# Patient Record
Sex: MALE | Race: WHITE | NOT HISPANIC OR LATINO | Employment: FULL TIME | URBAN - METROPOLITAN AREA
[De-identification: names, ages, dates, MRNs, and addresses within clinical notes are randomized per-mention and may not be internally consistent; named-entity substitution may affect disease eponyms.]

---

## 2021-03-24 ENCOUNTER — TELEPHONE (OUTPATIENT)
Dept: SCHEDULING | Facility: IMAGING CENTER | Age: 41
End: 2021-03-24

## 2021-05-13 NOTE — PROGRESS NOTES
Virtual Visit: New Patient   This visit was conducted via Zoom using secure and encrypted videoconferencing technology. The patient was in a private location in the state of [place].    The patient's identity was confirmed and verbal consent was obtained for this virtual visit.    Subjective:     CC: Establish care    Boo Mendez is a 41 y.o. male presenting to establish care and to discuss the evaluation and management of:    The patient states he has PTSD symptoms for which she is on Paxil 10 mg daily.  He was previously on Zoloft but this made him feel too fatigued and foggy.  He states he has been on the Paxil for about 1 year.  He reports progressive fatigue over the last 6 months.  He feels tired all the time.  He has no motivation.  He reports decreased libido.  He has also had weight gain.  He denies any erectile dysfunction symptoms or difficulty sleeping.  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  He states he had MRI imaging done and was scheduled for surgery when Covid hit.  He does not recall who he was seeing for this but he will try to recall the name and send it to me via [employer] so we can obtain these records.  He states it is not currently causing him any pain.      ROS:   Gen: no fevers/chills  Pulm: no sob, no cough  CV: no chest pain, no palpitations  GI: no nausea/vomiting, no diarrhea  Neuro: no headaches, no numbness/tingling    No Known Allergies    Current medicines (including changes today)  Current Outpatient Medications   Medication Sig Dispense Refill   • PARoxetine (PAXIL) 10 MG Tab Take 10 mg by mouth every day.       No current facility-administered medications for this visit.       He  has no past medical history on file.  He  has no past surgical history on file.      Family History   Problem Relation Age of Onset   • No Known Problems Mother    • No Known Problems Father      No family status information on file.  "      Patient Active Problem List    Diagnosis Date Noted   • PTSD (post-traumatic stress disorder) 05/14/2021   • Fatigue 05/14/2021   • Neuroma 05/14/2021          Objective:   Resp 14   Ht 1.778 m (5' 10\")   Wt 88.5 kg (195 lb)   BMI 27.98 kg/m²     Physical Exam:  Constitutional: Alert, no distress, well-groomed.  Skin: No rashes in visible areas.  Eye: Round. Conjunctiva clear, lids normal. No icterus.   ENMT: Lips pink without lesions, good dentition, moist mucous membranes. Phonation normal.  Neck: No masses, no thyromegaly. Moves freely without pain.  Respiratory: Unlabored respiratory effort, no cough or audible wheeze  Psych: Alert and oriented x3, normal affect and mood.       Assessment and Plan:   The following treatment plan was discussed:     PTSD (post-traumatic stress disorder)  This is a chronic condition.  The patient is on Paxil 10 mg daily.  He states his symptoms are well controlled on this medication.  He did not do well on Zoloft.  -Continue Paxil 10 mg daily    Fatigue, unspecified type  This is an acute condition.  Will check labs to see if there are any metabolic abnormalities that may be causing this.  If everything is normal then one possibility could be that it is Paxil induced.  Should that be the case we can discuss different treatment options for his PTSD.  - TSH; Future  - FREE THYROXINE; Future  - TESTOSTERONE BIO/SHBG MALE; Future    Neuroma  This is a chronic condition.  Patient has had previous evaluation as well as MRI imaging.  He is going to attempt to locate the name of the provider where he was evaluated so that we can obtain those records.    Screening for deficiency anemia  - CBC WITHOUT DIFFERENTIAL; Future    Screening for cardiovascular condition  - Lipid Profile; Future    Encounter for screening for diabetes mellitus  - Comp Metabolic Panel; Future      Follow-up: Return in about 2 weeks (around 5/28/2021).     Please note that this dictation was created using " voice recognition software. I have made every reasonable attempt to correct obvious errors, but I expect that there are errors of grammar and possibly content that I did not discover before finalizing the note.

## 2021-05-14 ENCOUNTER — TELEMEDICINE (OUTPATIENT)
Dept: MEDICAL GROUP | Facility: PHYSICIAN GROUP | Age: 41
End: 2021-05-14
Payer: OTHER GOVERNMENT

## 2021-05-14 VITALS — WEIGHT: 195 LBS | HEIGHT: 70 IN | RESPIRATION RATE: 14 BRPM | BODY MASS INDEX: 27.92 KG/M2

## 2021-05-14 DIAGNOSIS — Z13.0 SCREENING FOR DEFICIENCY ANEMIA: ICD-10-CM

## 2021-05-14 DIAGNOSIS — Z13.6 SCREENING FOR CARDIOVASCULAR CONDITION: ICD-10-CM

## 2021-05-14 DIAGNOSIS — R53.83 FATIGUE, UNSPECIFIED TYPE: ICD-10-CM

## 2021-05-14 DIAGNOSIS — F43.10 PTSD (POST-TRAUMATIC STRESS DISORDER): ICD-10-CM

## 2021-05-14 DIAGNOSIS — D36.10 NEUROMA: ICD-10-CM

## 2021-05-14 DIAGNOSIS — Z13.1 ENCOUNTER FOR SCREENING FOR DIABETES MELLITUS: ICD-10-CM

## 2021-05-14 PROCEDURE — 99204 OFFICE O/P NEW MOD 45 MIN: CPT | Performed by: INTERNAL MEDICINE

## 2021-05-14 RX ORDER — PAROXETINE 10 MG/1
10 TABLET, FILM COATED ORAL DAILY
COMMUNITY
End: 2021-05-25

## 2021-05-14 ASSESSMENT — PATIENT HEALTH QUESTIONNAIRE - PHQ9: CLINICAL INTERPRETATION OF PHQ2 SCORE: 0

## 2021-05-18 ENCOUNTER — HOSPITAL ENCOUNTER (OUTPATIENT)
Dept: LAB | Facility: MEDICAL CENTER | Age: 41
End: 2021-05-18
Attending: INTERNAL MEDICINE
Payer: OTHER GOVERNMENT

## 2021-05-18 DIAGNOSIS — Z13.1 ENCOUNTER FOR SCREENING FOR DIABETES MELLITUS: ICD-10-CM

## 2021-05-18 DIAGNOSIS — Z13.6 SCREENING FOR CARDIOVASCULAR CONDITION: ICD-10-CM

## 2021-05-18 DIAGNOSIS — Z13.0 SCREENING FOR DEFICIENCY ANEMIA: ICD-10-CM

## 2021-05-18 DIAGNOSIS — R53.83 FATIGUE, UNSPECIFIED TYPE: ICD-10-CM

## 2021-05-18 LAB
ALBUMIN SERPL BCP-MCNC: 4.3 G/DL (ref 3.2–4.9)
ALBUMIN/GLOB SERPL: 1.4 G/DL
ALP SERPL-CCNC: 85 U/L (ref 30–99)
ALT SERPL-CCNC: 33 U/L (ref 2–50)
ANION GAP SERPL CALC-SCNC: 9 MMOL/L (ref 7–16)
AST SERPL-CCNC: 19 U/L (ref 12–45)
BILIRUB SERPL-MCNC: 0.5 MG/DL (ref 0.1–1.5)
BUN SERPL-MCNC: 13 MG/DL (ref 8–22)
CALCIUM SERPL-MCNC: 9.2 MG/DL (ref 8.5–10.5)
CHLORIDE SERPL-SCNC: 104 MMOL/L (ref 96–112)
CHOLEST SERPL-MCNC: 149 MG/DL (ref 100–199)
CO2 SERPL-SCNC: 25 MMOL/L (ref 20–33)
CREAT SERPL-MCNC: 1.2 MG/DL (ref 0.5–1.4)
ERYTHROCYTE [DISTWIDTH] IN BLOOD BY AUTOMATED COUNT: 39.6 FL (ref 35.9–50)
GLOBULIN SER CALC-MCNC: 3 G/DL (ref 1.9–3.5)
GLUCOSE SERPL-MCNC: 101 MG/DL (ref 65–99)
HCT VFR BLD AUTO: 47.7 % (ref 42–52)
HDLC SERPL-MCNC: 39 MG/DL
HGB BLD-MCNC: 16.1 G/DL (ref 14–18)
LDLC SERPL CALC-MCNC: 81 MG/DL
MCH RBC QN AUTO: 30.3 PG (ref 27–33)
MCHC RBC AUTO-ENTMCNC: 33.8 G/DL (ref 33.7–35.3)
MCV RBC AUTO: 89.7 FL (ref 81.4–97.8)
PLATELET # BLD AUTO: 264 K/UL (ref 164–446)
PMV BLD AUTO: 9.9 FL (ref 9–12.9)
POTASSIUM SERPL-SCNC: 4.2 MMOL/L (ref 3.6–5.5)
PROT SERPL-MCNC: 7.3 G/DL (ref 6–8.2)
RBC # BLD AUTO: 5.32 M/UL (ref 4.7–6.1)
SODIUM SERPL-SCNC: 138 MMOL/L (ref 135–145)
T4 FREE SERPL-MCNC: 1.06 NG/DL (ref 0.93–1.7)
TRIGL SERPL-MCNC: 146 MG/DL (ref 0–149)
TSH SERPL DL<=0.005 MIU/L-ACNC: 1.71 UIU/ML (ref 0.38–5.33)
WBC # BLD AUTO: 6.9 K/UL (ref 4.8–10.8)

## 2021-05-18 PROCEDURE — 84403 ASSAY OF TOTAL TESTOSTERONE: CPT

## 2021-05-18 PROCEDURE — 85027 COMPLETE CBC AUTOMATED: CPT

## 2021-05-18 PROCEDURE — 36415 COLL VENOUS BLD VENIPUNCTURE: CPT

## 2021-05-18 PROCEDURE — 84270 ASSAY OF SEX HORMONE GLOBUL: CPT

## 2021-05-18 PROCEDURE — 80053 COMPREHEN METABOLIC PANEL: CPT

## 2021-05-18 PROCEDURE — 84439 ASSAY OF FREE THYROXINE: CPT

## 2021-05-18 PROCEDURE — 80061 LIPID PANEL: CPT

## 2021-05-18 PROCEDURE — 84402 ASSAY OF FREE TESTOSTERONE: CPT

## 2021-05-18 PROCEDURE — 84443 ASSAY THYROID STIM HORMONE: CPT

## 2021-05-20 LAB
SHBG SERPL-SCNC: 21 NMOL/L (ref 11–80)
TESTOST FREE MFR SERPL: 2.2 % (ref 1.6–2.9)
TESTOST FREE SERPL-MCNC: 79 PG/ML (ref 47–244)
TESTOST SERPL-MCNC: 357 NG/DL (ref 300–890)
TESTOSTERONE.FREE+WB SERPL-MCNC: 226 NG/DL (ref 131–682)

## 2021-05-25 ENCOUNTER — TELEMEDICINE (OUTPATIENT)
Dept: MEDICAL GROUP | Facility: PHYSICIAN GROUP | Age: 41
End: 2021-05-25
Payer: OTHER GOVERNMENT

## 2021-05-25 VITALS — HEIGHT: 70 IN | BODY MASS INDEX: 26.77 KG/M2 | WEIGHT: 187 LBS | RESPIRATION RATE: 14 BRPM

## 2021-05-25 DIAGNOSIS — D36.10 NEUROMA: ICD-10-CM

## 2021-05-25 DIAGNOSIS — R53.83 FATIGUE, UNSPECIFIED TYPE: ICD-10-CM

## 2021-05-25 DIAGNOSIS — F43.10 PTSD (POST-TRAUMATIC STRESS DISORDER): ICD-10-CM

## 2021-05-25 PROCEDURE — 99214 OFFICE O/P EST MOD 30 MIN: CPT | Mod: GT | Performed by: INTERNAL MEDICINE

## 2021-05-25 RX ORDER — FLUOXETINE HYDROCHLORIDE 20 MG/1
20 CAPSULE ORAL DAILY
Qty: 30 CAPSULE | Refills: 3 | Status: SHIPPED | OUTPATIENT
Start: 2021-05-25 | End: 2022-02-23

## 2021-05-25 ASSESSMENT — FIBROSIS 4 INDEX: FIB4 SCORE: 0.5

## 2021-06-25 NOTE — PROGRESS NOTES
"Virtual Visit: Established Patient   This visit was conducted via Zoom using secure and encrypted videoconferencing technology. The patient was in a private location in the [facility].    The patient's identity was confirmed and verbal consent was obtained for this virtual visit.    Subjective:   CC: Follow-up on PTSD    Judd Mendez is a 41 y.o. male presenting for evaluation and management of:    At the patient's last visit he was changed over to fluoxetine 20 mg daily.  The patient states that he has noticed that he feels more groggy on the fluoxetine.  His grogginess is worse when he is tired.  It is particularly difficult in the mornings when he is tired.      ROS   Denies any recent fevers or chills. No nausea or vomiting. No chest pains or shortness of breath.     No Known Allergies    Current medicines (including changes today)  Current Outpatient Medications   Medication Sig Dispense Refill   • FLUoxetine (PROZAC) 20 MG Cap Take 1 capsule by mouth every day. 30 capsule 3     No current facility-administered medications for this visit.       Patient Active Problem List    Diagnosis Date Noted   • PTSD (post-traumatic stress disorder) 05/14/2021   • Fatigue 05/14/2021   • Neuroma 05/14/2021       Family History   Problem Relation Age of Onset   • No Known Problems Mother    • No Known Problems Father        He  has no past medical history on file.  He  has no past surgical history on file.       Objective:   Resp 14   Ht 1.778 m (5' 10\")   Wt 81.6 kg (180 lb)   BMI 25.83 kg/m²     Physical Exam:  Constitutional: Alert, no distress, well-groomed.  Skin: No rashes in visible areas.  Eye: Round. Conjunctiva clear, lids normal. No icterus.   ENMT: Lips pink without lesions, good dentition, moist mucous membranes. Phonation normal.  Neck: No masses, no thyromegaly. Moves freely without pain.  Respiratory: Unlabored respiratory effort, no cough or audible wheeze  Psych: Alert and oriented x3, normal " affect and mood.       Assessment and Plan:   The following treatment plan was discussed:     PTSD (post-traumatic stress disorder)  This is a chronic condition.    Stable.    At the patient's last visit he was transitioned from paroxetine to fluoxetine 20 mg daily as he was experiencing fatigue from the paroxetine.  Patient reports increased grogginess on the fluoxetine.  He did not do well on Zoloft.  -Continue fluoxetine 20 mg daily for now and see if his grogginess improves.  If no improvement patient will need to decide whether the paroxetine or fluoxetine is more effective and tolerable for him.  -Patient to follow-up in 4 weeks to assess is doing on the medication    My total time spent caring for the patient on the day of the encounter was 15 minutes.   This does not include time spent on separately billable procedures/tests.    Follow-up: Return in about 4 weeks (around 7/27/2021) for Med Check.     Please note that this dictation was created using voice recognition software. I have made every reasonable attempt to correct obvious errors, but I expect that there are errors of grammar and possibly content that I did not discover before finalizing the note.

## 2021-06-29 ENCOUNTER — TELEMEDICINE (OUTPATIENT)
Dept: MEDICAL GROUP | Facility: PHYSICIAN GROUP | Age: 41
End: 2021-06-29
Payer: OTHER GOVERNMENT

## 2021-06-29 VITALS — BODY MASS INDEX: 25.77 KG/M2 | HEIGHT: 70 IN | RESPIRATION RATE: 14 BRPM | WEIGHT: 180 LBS

## 2021-06-29 DIAGNOSIS — F43.10 PTSD (POST-TRAUMATIC STRESS DISORDER): ICD-10-CM

## 2021-06-29 PROCEDURE — 99212 OFFICE O/P EST SF 10 MIN: CPT | Performed by: INTERNAL MEDICINE

## 2021-06-29 ASSESSMENT — FIBROSIS 4 INDEX: FIB4 SCORE: 0.5

## 2022-02-21 NOTE — PROGRESS NOTES
"Subjective:     CC:   Chief Complaint   Patient presents with   • Annual Wellness Visit         HPI:   Judd presents today to discuss the following issues:    The patient states he stopped taking any medication for his PTSD.  He is doing pretty well.  He still has some bouts of anger, but overall it is tolerable.  He also reports intermittent right hand pain and weakness over the last 2 years.  He states it comes and goes.  It is on his right hand at the base of his thumb.  He is right-handed and has now noticed a weakened .  He has taken ibuprofen for it occasionally with some improvement.      History reviewed. No pertinent past medical history.    Social History     Tobacco Use   • Smoking status: Former Smoker   • Smokeless tobacco: Never Used   Vaping Use   • Vaping Use: Never used   Substance Use Topics   • Alcohol use: Yes     Comment: 2-3/week   • Drug use: Never       Current Outpatient Medications Ordered in Epic   Medication Sig Dispense Refill   • FLUoxetine (PROZAC) 20 MG Cap Take 1 capsule by mouth every day. 30 capsule 3     No current [facility]-ordered facility-administered medications on file.       Allergies:  Patient has no known allergies.    Health Maintenance: Completed    ROS:   Denies any recent fevers or chills. No nausea or vomiting. No chest pains or shortness of breath.      Objective:       Exam:  /78 (BP Location: Right arm, Patient Position: Sitting, BP Cuff Size: Adult)   Pulse 70   Temp 36.8 °C (98.2 °F) (Temporal)   Resp 18   Ht 1.778 m (5' 10\")   Wt 84.4 kg (186 lb)   SpO2 97%   BMI 26.69 kg/m²  Body mass index is 26.69 kg/m².    Gen: Alert and oriented, No apparent distress.  Lungs: Normal effort, CTA bilaterally, no wheezes, rhonchi, or rales  CV: Regular rate and rhythm. No murmurs, rubs, or gallops.  Ext: TTP over right thenar eminence, Phalen's and Tinel's negative, Finkelstein's test negative      Assessment & Plan:     41 y.o. male with the following - "     Hand pain, right  This is a chronic medical condition.  Progressive.  The patient is reporting a 2-year history of increasing right hand/thumb pain.  The pain is located over the right thumb pad.  It is also associated with  strength weakening.  He denies numbness or tingling.  Patient has TTP over the right thenar eminence, Phalen's, Tinel's, and Finkelstein's testing is negative.  The location of the pain does not appear to be completely consistent with carpal tunnel syndrome or de Quervain's tenosynovitis, but these are still possibilities.  Right thumb osteoarthritis is also a possibility.  Will initiate a comprehensive evaluation by getting an x-ray of the hand.  Patient was given an informational handout on de Quervain's tenosynovitis.  He was advised to try either a CTS wrist splint or de Quervain's tenosynovitis thumb splint.  - DX-HAND 3+ RIGHT; Future    PTSD (post-traumatic stress disorder)  This is a chronic medical condition.    Stable.      The patient is no longer taking medication for this condition.  Paroxetine, fluoxetine, and sertraline were all intolerable secondary to daytime grogginess.      Need for vaccination  - INFLUENZA VACCINE QUAD INJ (PF)  - TDAP VACCINE =>8YO IM        I spent a total of 30 minutes with record review, exam, communication with the patient, communication with other providers, and documentation of this encounter.      Return if symptoms worsen or fail to improve.    Please note that this dictation was created using voice recognition software. I have made every reasonable attempt to correct obvious errors, but I expect that there are errors of grammar and possibly content that I did not discover before finalizing the note.

## 2022-02-22 ENCOUNTER — OFFICE VISIT (OUTPATIENT)
Dept: MEDICAL GROUP | Facility: PHYSICIAN GROUP | Age: 42
End: 2022-02-22
Payer: OTHER GOVERNMENT

## 2022-02-22 ENCOUNTER — HOSPITAL ENCOUNTER (OUTPATIENT)
Dept: RADIOLOGY | Facility: MEDICAL CENTER | Age: 42
End: 2022-02-22
Attending: INTERNAL MEDICINE
Payer: OTHER GOVERNMENT

## 2022-02-22 VITALS
OXYGEN SATURATION: 97 % | WEIGHT: 186 LBS | RESPIRATION RATE: 18 BRPM | HEART RATE: 70 BPM | HEIGHT: 70 IN | SYSTOLIC BLOOD PRESSURE: 122 MMHG | BODY MASS INDEX: 26.63 KG/M2 | TEMPERATURE: 98.2 F | DIASTOLIC BLOOD PRESSURE: 78 MMHG

## 2022-02-22 DIAGNOSIS — F43.10 PTSD (POST-TRAUMATIC STRESS DISORDER): ICD-10-CM

## 2022-02-22 DIAGNOSIS — Z23 NEED FOR VACCINATION: ICD-10-CM

## 2022-02-22 DIAGNOSIS — M79.641 HAND PAIN, RIGHT: ICD-10-CM

## 2022-02-22 PROCEDURE — 90472 IMMUNIZATION ADMIN EACH ADD: CPT | Performed by: INTERNAL MEDICINE

## 2022-02-22 PROCEDURE — 99214 OFFICE O/P EST MOD 30 MIN: CPT | Mod: 25 | Performed by: INTERNAL MEDICINE

## 2022-02-22 PROCEDURE — 90686 IIV4 VACC NO PRSV 0.5 ML IM: CPT | Performed by: INTERNAL MEDICINE

## 2022-02-22 PROCEDURE — 90715 TDAP VACCINE 7 YRS/> IM: CPT | Performed by: INTERNAL MEDICINE

## 2022-02-22 PROCEDURE — 90471 IMMUNIZATION ADMIN: CPT | Performed by: INTERNAL MEDICINE

## 2022-02-22 PROCEDURE — 73130 X-RAY EXAM OF HAND: CPT | Mod: RT

## 2022-02-22 SDOH — ECONOMIC STABILITY: FOOD INSECURITY: WITHIN THE PAST 12 MONTHS, YOU WORRIED THAT YOUR FOOD WOULD RUN OUT BEFORE YOU GOT MONEY TO BUY MORE.: NEVER TRUE

## 2022-02-22 SDOH — ECONOMIC STABILITY: TRANSPORTATION INSECURITY
IN THE PAST 12 MONTHS, HAS LACK OF RELIABLE TRANSPORTATION KEPT YOU FROM MEDICAL APPOINTMENTS, MEETINGS, WORK OR FROM GETTING THINGS NEEDED FOR DAILY LIVING?: NO

## 2022-02-22 SDOH — ECONOMIC STABILITY: HOUSING INSECURITY
IN THE LAST 12 MONTHS, WAS THERE A TIME WHEN YOU DID NOT HAVE A STEADY PLACE TO SLEEP OR SLEPT IN A SHELTER (INCLUDING NOW)?: NO

## 2022-02-22 SDOH — ECONOMIC STABILITY: HOUSING INSECURITY: IN THE LAST 12 MONTHS, HOW MANY PLACES HAVE YOU LIVED?: 1

## 2022-02-22 SDOH — ECONOMIC STABILITY: INCOME INSECURITY: HOW HARD IS IT FOR YOU TO PAY FOR THE VERY BASICS LIKE FOOD, HOUSING, MEDICAL CARE, AND HEATING?: NOT HARD AT ALL

## 2022-02-22 SDOH — ECONOMIC STABILITY: INCOME INSECURITY: IN THE LAST 12 MONTHS, WAS THERE A TIME WHEN YOU WERE NOT ABLE TO PAY THE MORTGAGE OR RENT ON TIME?: NO

## 2022-02-22 SDOH — ECONOMIC STABILITY: FOOD INSECURITY: WITHIN THE PAST 12 MONTHS, THE FOOD YOU BOUGHT JUST DIDN'T LAST AND YOU DIDN'T HAVE MONEY TO GET MORE.: NEVER TRUE

## 2022-02-22 SDOH — ECONOMIC STABILITY: TRANSPORTATION INSECURITY
IN THE PAST 12 MONTHS, HAS THE LACK OF TRANSPORTATION KEPT YOU FROM MEDICAL APPOINTMENTS OR FROM GETTING MEDICATIONS?: NO

## 2022-02-22 SDOH — HEALTH STABILITY: PHYSICAL HEALTH: ON AVERAGE, HOW MANY DAYS PER WEEK DO YOU ENGAGE IN MODERATE TO STRENUOUS EXERCISE (LIKE A BRISK WALK)?: 5 DAYS

## 2022-02-22 SDOH — ECONOMIC STABILITY: TRANSPORTATION INSECURITY
IN THE PAST 12 MONTHS, HAS LACK OF TRANSPORTATION KEPT YOU FROM MEETINGS, WORK, OR FROM GETTING THINGS NEEDED FOR DAILY LIVING?: NO

## 2022-02-22 SDOH — HEALTH STABILITY: PHYSICAL HEALTH: ON AVERAGE, HOW MANY MINUTES DO YOU ENGAGE IN EXERCISE AT THIS LEVEL?: 20 MIN

## 2022-02-22 SDOH — HEALTH STABILITY: MENTAL HEALTH
STRESS IS WHEN SOMEONE FEELS TENSE, NERVOUS, ANXIOUS, OR CAN'T SLEEP AT NIGHT BECAUSE THEIR MIND IS TROUBLED. HOW STRESSED ARE YOU?: ONLY A LITTLE

## 2022-02-22 ASSESSMENT — SOCIAL DETERMINANTS OF HEALTH (SDOH)
HOW OFTEN DO YOU HAVE A DRINK CONTAINING ALCOHOL: 4 OR MORE TIMES A WEEK
HOW OFTEN DO YOU ATTEND CHURCH OR RELIGIOUS SERVICES?: NEVER
HOW OFTEN DO YOU GET TOGETHER WITH FRIENDS OR RELATIVES?: ONCE A WEEK
DO YOU BELONG TO ANY CLUBS OR ORGANIZATIONS SUCH AS CHURCH GROUPS UNIONS, FRATERNAL OR ATHLETIC GROUPS, OR SCHOOL GROUPS?: NO
HOW OFTEN DO YOU ATTENT MEETINGS OF THE CLUB OR ORGANIZATION YOU BELONG TO?: NEVER
HOW OFTEN DO YOU GET TOGETHER WITH FRIENDS OR RELATIVES?: ONCE A WEEK
WITHIN THE PAST 12 MONTHS, YOU WORRIED THAT YOUR FOOD WOULD RUN OUT BEFORE YOU GOT THE MONEY TO BUY MORE: NEVER TRUE
IN A TYPICAL WEEK, HOW MANY TIMES DO YOU TALK ON THE PHONE WITH FAMILY, FRIENDS, OR NEIGHBORS?: ONCE A WEEK
IN A TYPICAL WEEK, HOW MANY TIMES DO YOU TALK ON THE PHONE WITH FAMILY, FRIENDS, OR NEIGHBORS?: ONCE A WEEK
HOW OFTEN DO YOU ATTEND CHURCH OR RELIGIOUS SERVICES?: NEVER
HOW OFTEN DO YOU HAVE SIX OR MORE DRINKS ON ONE OCCASION: LESS THAN MONTHLY
HOW OFTEN DO YOU ATTENT MEETINGS OF THE CLUB OR ORGANIZATION YOU BELONG TO?: NEVER
HOW MANY DRINKS CONTAINING ALCOHOL DO YOU HAVE ON A TYPICAL DAY WHEN YOU ARE DRINKING: 1 OR 2
DO YOU BELONG TO ANY CLUBS OR ORGANIZATIONS SUCH AS CHURCH GROUPS UNIONS, FRATERNAL OR ATHLETIC GROUPS, OR SCHOOL GROUPS?: NO
HOW HARD IS IT FOR YOU TO PAY FOR THE VERY BASICS LIKE FOOD, HOUSING, MEDICAL CARE, AND HEATING?: NOT HARD AT ALL

## 2022-02-22 ASSESSMENT — LIFESTYLE VARIABLES
HOW OFTEN DO YOU HAVE A DRINK CONTAINING ALCOHOL: 4 OR MORE TIMES A WEEK
HOW MANY STANDARD DRINKS CONTAINING ALCOHOL DO YOU HAVE ON A TYPICAL DAY: 1 OR 2
HOW OFTEN DO YOU HAVE SIX OR MORE DRINKS ON ONE OCCASION: LESS THAN MONTHLY

## 2022-02-22 ASSESSMENT — PATIENT HEALTH QUESTIONNAIRE - PHQ9: CLINICAL INTERPRETATION OF PHQ2 SCORE: 0

## 2022-02-22 ASSESSMENT — FIBROSIS 4 INDEX: FIB4 SCORE: 0.5

## 2022-06-03 ENCOUNTER — OFFICE VISIT (OUTPATIENT)
Dept: URGENT CARE | Facility: PHYSICIAN GROUP | Age: 42
End: 2022-06-03
Payer: OTHER GOVERNMENT

## 2022-06-03 VITALS
HEIGHT: 70 IN | WEIGHT: 180 LBS | TEMPERATURE: 98.2 F | BODY MASS INDEX: 25.77 KG/M2 | OXYGEN SATURATION: 98 % | DIASTOLIC BLOOD PRESSURE: 78 MMHG | RESPIRATION RATE: 15 BRPM | SYSTOLIC BLOOD PRESSURE: 108 MMHG | HEART RATE: 61 BPM

## 2022-06-03 DIAGNOSIS — J01.90 ACUTE NON-RECURRENT SINUSITIS, UNSPECIFIED LOCATION: ICD-10-CM

## 2022-06-03 PROCEDURE — 99213 OFFICE O/P EST LOW 20 MIN: CPT | Performed by: PHYSICIAN ASSISTANT

## 2022-06-03 RX ORDER — DEXAMETHASONE SODIUM PHOSPHATE 10 MG/ML
10 INJECTION INTRAMUSCULAR; INTRAVENOUS ONCE
Status: COMPLETED | OUTPATIENT
Start: 2022-06-03 | End: 2022-06-03

## 2022-06-03 RX ORDER — FLUTICASONE PROPIONATE 50 MCG
1 SPRAY, SUSPENSION (ML) NASAL DAILY
Qty: 16 G | Refills: 0 | Status: SHIPPED | OUTPATIENT
Start: 2022-06-03 | End: 2022-10-15

## 2022-06-03 RX ADMIN — DEXAMETHASONE SODIUM PHOSPHATE 10 MG: 10 INJECTION INTRAMUSCULAR; INTRAVENOUS at 18:39

## 2022-06-03 ASSESSMENT — ENCOUNTER SYMPTOMS
SHORTNESS OF BREATH: 1
SINUS PAIN: 0
COUGH: 0
ABDOMINAL PAIN: 0
CHILLS: 0
DIZZINESS: 0
FEVER: 0
EYE PAIN: 0
CONSTIPATION: 0
DIAPHORESIS: 0
EYE REDNESS: 0
SORE THROAT: 1
NAUSEA: 0
DIARRHEA: 0
VOMITING: 0
HEADACHES: 0
EYE DISCHARGE: 0
WHEEZING: 0

## 2022-06-03 ASSESSMENT — FIBROSIS 4 INDEX: FIB4 SCORE: 0.51

## 2022-06-04 NOTE — PROGRESS NOTES
"  Subjective:     Judd Mendez  is a 42 y.o. male who presents for Cough (All symptoms 1 week), Nasal Congestion, and Otalgia       He presents today with sinus congestion, ear fullness, sore throat and shortness of breath that been ongoing since Saturday.  He is also been experiencing chest pain that is worsened after strenuous activities.  At this time he denies fever/chills/sweats, abdominal pain, nausea/vomiting, constipation/diarrhea.  Denies any close sick contacts.  Has been using DayQuil for symptom relief.       Review of Systems   Constitutional: Negative for chills, diaphoresis, fever and malaise/fatigue.   HENT: Positive for congestion, ear pain and sore throat. Negative for ear discharge, hearing loss and sinus pain.    Eyes: Negative for pain, discharge and redness.   Respiratory: Positive for shortness of breath. Negative for cough and wheezing.    Cardiovascular: Positive for chest pain.   Gastrointestinal: Negative for abdominal pain, constipation, diarrhea, nausea and vomiting.   Genitourinary: Negative for dysuria, frequency and urgency.   Neurological: Negative for dizziness and headaches.      No Known Allergies  History reviewed. No pertinent past medical history.     Objective:   /78 (BP Location: Left arm, Patient Position: Sitting, BP Cuff Size: Adult)   Pulse 61   Temp 36.8 °C (98.2 °F) (Temporal)   Resp 15   Ht 1.778 m (5' 10\")   Wt 81.6 kg (180 lb)   SpO2 98%   BMI 25.83 kg/m²   Physical Exam  Vitals and nursing note reviewed.   Constitutional:       General: He is not in acute distress.     Appearance: Normal appearance. He is not ill-appearing, toxic-appearing or diaphoretic.   HENT:      Head: Normocephalic.      Right Ear: Tympanic membrane, ear canal and external ear normal. There is no impacted cerumen.      Left Ear: Tympanic membrane, ear canal and external ear normal. There is no impacted cerumen.      Nose: Congestion present. No rhinorrhea.      Mouth/Throat: "      Mouth: Mucous membranes are moist.      Pharynx: Posterior oropharyngeal erythema present. No oropharyngeal exudate.   Eyes:      General:         Right eye: No discharge.         Left eye: No discharge.      Conjunctiva/sclera: Conjunctivae normal.   Cardiovascular:      Rate and Rhythm: Normal rate and regular rhythm.   Pulmonary:      Effort: Pulmonary effort is normal. No respiratory distress.      Breath sounds: Normal breath sounds. No stridor. No wheezing or rhonchi.   Musculoskeletal:      Cervical back: Neck supple.   Lymphadenopathy:      Cervical: No cervical adenopathy.   Neurological:      General: No focal deficit present.      Mental Status: He is alert and oriented to person, place, and time.   Psychiatric:         Mood and Affect: Mood normal.         Behavior: Behavior normal.         Thought Content: Thought content normal.         Judgment: Judgment normal.             Diagnostic testing: None    Assessment/Plan:     Encounter Diagnoses   Name Primary?   • Acute non-recurrent sinusitis, unspecified location       Plan for care for today's complaint includes Decadron oral drink in office today, Flonase.  Based on his symptom history and physical exam there is evidence that this is a viral upper respiratory infection, no antibiotics warranted at this time.  If symptoms remain ongoing for the next 7-10 days or if they get significantly worse then he should return the urgent care for reevaluation at that time.  She will follow-up in the emergency department if you develop severe chest pain, shortness of breath, difficulty breathing.  Prescription for Flonase provided.    See AVS Instructions below for written guidance provided to patient on after-visit management and care in addition to our verbal discussion during the visit.    Please note that this dictation was created using voice recognition software. I have attempted to correct all errors, but there may be sound-alike, spelling, grammar and  possibly content errors that I did not discover before finalizing the note.    Knox Rylee VARGHESE

## 2022-10-15 ENCOUNTER — OFFICE VISIT (OUTPATIENT)
Dept: URGENT CARE | Facility: PHYSICIAN GROUP | Age: 42
End: 2022-10-15
Payer: OTHER GOVERNMENT

## 2022-10-15 DIAGNOSIS — H16.002 CORNEAL ULCER OF LEFT EYE: ICD-10-CM

## 2022-10-15 PROCEDURE — 99214 OFFICE O/P EST MOD 30 MIN: CPT | Performed by: FAMILY MEDICINE

## 2022-10-15 RX ORDER — CIPROFLOXACIN HYDROCHLORIDE 3.5 MG/ML
SOLUTION/ DROPS TOPICAL
Qty: 10 ML | Refills: 0 | Status: SHIPPED | OUTPATIENT
Start: 2022-10-15 | End: 2023-04-27

## 2022-10-15 RX ORDER — CIPROFLOXACIN HYDROCHLORIDE 3.5 MG/ML
SOLUTION/ DROPS TOPICAL
Qty: 10 ML | Refills: 0 | Status: SHIPPED | OUTPATIENT
Start: 2022-10-15 | End: 2022-10-15 | Stop reason: SDUPTHER

## 2022-10-15 ASSESSMENT — ENCOUNTER SYMPTOMS
PHOTOPHOBIA: 1
EYE PAIN: 1
EYE REDNESS: 1

## 2022-10-15 ASSESSMENT — FIBROSIS 4 INDEX: FIB4 SCORE: 0.51

## 2022-10-15 NOTE — PROGRESS NOTES
"Subjective     Judd Mendez is a 42 y.o. male who presents with Eye Problem (Pt states he went to eye doctor yesterday, was given ABX, pharmacy didn't get it, offices are closed, symptoms have worsened in L eye. X 4 days)      - This is a pleasant and nontoxic appearing 42 y.o. male who has come to the walk-in clinic today for:    #1) ~4 days ago woke up w/ Lt eye irritation (has hx or getting corneal abrasions in his sleep). Irritation got worse and turned to pain and light sensitivity and a little blurry visio so went to Optometrist yesterday who got him in with Ophthamologist yesterday to evaluate a corneal ulcer. Says they were to call in an abx that started with Letter M for him but pharmacy never got it and no one from that office is returning his phone calls (on-call service has not returned my call either). Has f/u w/ their office Monday but wants to get on abx drops    No specific eye injury or use of contact lenses.       ALLERGIES:  Patient has no known allergies.     PMH:  History reviewed. No pertinent past medical history.     PSH:  History reviewed. No pertinent surgical history.    MEDS:    Current Outpatient Medications:     ciprofloxacin (CILOXIN) 0.3 % Solution, 2 gtt q 15 min x 6 hrs, then q 30 min x 18 hrs, then q 1 hr x 1 day, then q 4 hrs x 5 days, Disp: 10 mL, Rfl: 0    ** I have documented what I find to be significant in regards to past medical, social, family and surgical history  in my HPI or under PMH/PSH/FH review section, otherwise it is noncontributory **         HPI    Review of Systems   Eyes:  Positive for photophobia, pain and redness.   All other systems reviewed and are negative.           Objective     BP (P) 122/80 (BP Location: Right arm, Patient Position: Sitting, BP Cuff Size: Adult)   Pulse (P) 82   Temp (P) 37.2 °C (99 °F) (Temporal)   Resp (P) 18   Ht (P) 1.778 m (5' 10\")   Wt (P) 81.6 kg (180 lb)   SpO2 (P) 97%   BMI (P) 25.83 kg/m²      Rt 20/20   Lt not " barely able to see 20/200  B 20/25    Physical Exam  Vitals and nursing note reviewed.   Constitutional:       General: He is not in acute distress.     Appearance: Normal appearance. He is well-developed.   HENT:      Head: Normocephalic.   Eyes:        Comments: Lt eye: injected w/ clear watery eye dc, + corneal ulcer and some light sensitivity.    Cardiovascular:      Heart sounds: Normal heart sounds. No murmur heard.  Pulmonary:      Effort: Pulmonary effort is normal. No respiratory distress.   Neurological:      Mental Status: He is alert.      Motor: No abnormal muscle tone.   Psychiatric:         Mood and Affect: Mood normal.         Behavior: Behavior normal.       Assessment & Plan       1. Corneal ulcer of left eye  ciprofloxacin (CILOXIN) 0.3 % Solution    DISCONTINUED: ciprofloxacin (CILOXIN) 0.3 % Solution        Does no want to go to ER today for Optho consult but will go tomorrow if feeling eye is worse. Optho service from patient clinic returned call (Dr Betancourt) and we spoke, said Cipro drops are good choice to use and f/u as planned.  Will treat w/ above for corneal ulcer.     - Dx, plan & d/c instructions discussed   - Rest, stay hydrated, OTC Motrin and/or Tylenol as needed  - E.R. precautions discussed     Follow up with Eye Dr office Monday as planned ER if feeling/getting worse or vision change.     Patient left in stable condition

## 2023-04-25 NOTE — PROGRESS NOTES
"Subjective:     CC:   Chief Complaint   Patient presents with    Anxiety     Getting worse now patient states that it got worse once he stopped the antidepressants          HPI:   Judd Weeks is a 43-year-old male who presents today for an acute medical visit. The patient is reporting worsening PTSD symptoms.  He reports a progressive increase in his mood swings and becoming easily overwhelmed.  He has previously been on paroxetine, fluoxetine, and sertraline, but found them intolerable due to daytime grogginess.  He is interested in trying a new medication due to the severity of his symptoms.  He has no thoughts of self-harm.    History reviewed. No pertinent past medical history.    Social History     Tobacco Use    Smoking status: Former    Smokeless tobacco: Never   Vaping Use    Vaping Use: Never used   Substance Use Topics    Alcohol use: Yes     Comment: 5-7 drinks per week    Drug use: Never       Current Outpatient Medications Ordered in Epic   Medication Sig Dispense Refill    escitalopram (LEXAPRO) 5 MG tablet Take 1 Tablet by mouth every day. 90 Tablet 0     No current Epic-ordered facility-administered medications on file.       Allergies:  Patient has no known allergies.    Health Maintenance: Completed    Review of Systems:  No fevers or chills. No cough, chest pain, or shortness of breath.       Objective:       Exam:  /74   Pulse 65   Temp 36.7 °C (98.1 °F) (Temporal)   Ht 1.778 m (5' 10\")   Wt 86 kg (189 lb 9.6 oz)   SpO2 97%   BMI 27.20 kg/m²  Body mass index is 27.2 kg/m².    Gen: Alert and oriented, No apparent distress.  Lungs: Normal effort, CTA bilaterally, no wheezes, rhonchi, or rales  CV: Regular rate and rhythm. No murmurs, rubs, or gallops.  Ext: No clubbing, cyanosis, edema.        Assessment & Plan:     42 y.o. male with the following -     1. PTSD (post-traumatic stress disorder)  Chronic condition, progressive.  The patient is reporting worsening PTSD symptoms.  He " reports a progressive increase in his mood swings and becoming easily overwhelmed.  He has previously been on paroxetine, fluoxetine, and sertraline, but found them intolerable due to daytime grogginess.  He is interested in trying a new medication due to the severity of his symptoms.  He has no thoughts of self-harm.  -Start trial of escitalopram 5 mg daily, increasing to 10 mg to 20 mg as indicated by symptoms  - escitalopram (LEXAPRO) 5 MG tablet; Take 1 Tablet by mouth every day.  Dispense: 90 Tablet; Refill: 0    2. Screening for deficiency anemia  - CBC WITH DIFFERENTIAL; Future    3. Encounter for screening for diabetes mellitus  - Comp Metabolic Panel; Future    4. Screening for cardiovascular condition  - Lipid Profile; Future    5. Need for hepatitis C screening test  - HEP C VIRUS ANTIBODY; Future      Return in about 3 months (around 7/27/2023) for new antidepressant.    Please note that this dictation was created using voice recognition software. I have made every reasonable attempt to correct obvious errors, but I expect that there are errors of grammar and possibly content that I did not discover before finalizing the note.

## 2023-04-27 ENCOUNTER — OFFICE VISIT (OUTPATIENT)
Dept: MEDICAL GROUP | Facility: PHYSICIAN GROUP | Age: 43
End: 2023-04-27
Payer: COMMERCIAL

## 2023-04-27 VITALS
OXYGEN SATURATION: 97 % | TEMPERATURE: 98.1 F | HEART RATE: 65 BPM | DIASTOLIC BLOOD PRESSURE: 74 MMHG | HEIGHT: 70 IN | BODY MASS INDEX: 27.14 KG/M2 | WEIGHT: 189.6 LBS | SYSTOLIC BLOOD PRESSURE: 120 MMHG

## 2023-04-27 DIAGNOSIS — Z13.1 ENCOUNTER FOR SCREENING FOR DIABETES MELLITUS: ICD-10-CM

## 2023-04-27 DIAGNOSIS — Z13.0 SCREENING FOR DEFICIENCY ANEMIA: ICD-10-CM

## 2023-04-27 DIAGNOSIS — Z11.59 NEED FOR HEPATITIS C SCREENING TEST: ICD-10-CM

## 2023-04-27 DIAGNOSIS — Z13.6 SCREENING FOR CARDIOVASCULAR CONDITION: ICD-10-CM

## 2023-04-27 DIAGNOSIS — F43.10 PTSD (POST-TRAUMATIC STRESS DISORDER): ICD-10-CM

## 2023-04-27 PROCEDURE — 99214 OFFICE O/P EST MOD 30 MIN: CPT | Performed by: INTERNAL MEDICINE

## 2023-04-27 RX ORDER — ESCITALOPRAM OXALATE 5 MG/1
5 TABLET ORAL DAILY
Qty: 90 TABLET | Refills: 0 | Status: SHIPPED | OUTPATIENT
Start: 2023-04-27 | End: 2023-06-12 | Stop reason: SDUPTHER

## 2023-04-27 ASSESSMENT — FIBROSIS 4 INDEX: FIB4 SCORE: 0.53

## 2023-04-27 ASSESSMENT — PATIENT HEALTH QUESTIONNAIRE - PHQ9
SUM OF ALL RESPONSES TO PHQ QUESTIONS 1-9: 10
5. POOR APPETITE OR OVEREATING: 0 - NOT AT ALL
CLINICAL INTERPRETATION OF PHQ2 SCORE: 3

## 2023-06-12 DIAGNOSIS — F43.10 PTSD (POST-TRAUMATIC STRESS DISORDER): ICD-10-CM

## 2023-06-13 RX ORDER — ESCITALOPRAM OXALATE 5 MG/1
5 TABLET ORAL DAILY
Qty: 90 TABLET | Refills: 0 | Status: SHIPPED | OUTPATIENT
Start: 2023-06-13 | End: 2023-06-21 | Stop reason: DRUGHIGH

## 2023-06-20 DIAGNOSIS — F43.10 PTSD (POST-TRAUMATIC STRESS DISORDER): ICD-10-CM

## 2023-06-21 RX ORDER — ESCITALOPRAM OXALATE 10 MG/1
10 TABLET ORAL DAILY
Qty: 90 TABLET | Refills: 0 | Status: SHIPPED | OUTPATIENT
Start: 2023-06-21 | End: 2023-09-28 | Stop reason: SDUPTHER

## 2023-06-21 RX ORDER — ESCITALOPRAM OXALATE 5 MG/1
5 TABLET ORAL DAILY
Qty: 90 TABLET | Refills: 0 | OUTPATIENT
Start: 2023-06-21

## 2023-07-23 DIAGNOSIS — F43.10 PTSD (POST-TRAUMATIC STRESS DISORDER): ICD-10-CM

## 2023-07-24 NOTE — TELEPHONE ENCOUNTER
Received request via: Pharmacy    Was the patient seen in the last year in this department? Yes    Does the patient have an active prescription (recently filled or refills available) for medication(s) requested? No    Does the patient have [facility] and need 100 day supply (blood pressure, diabetes and cholesterol meds only)? Patient does not have SCP   Patient

## 2023-07-25 RX ORDER — ESCITALOPRAM OXALATE 5 MG/1
5 TABLET ORAL DAILY
Qty: 90 TABLET | Refills: 0 | Status: SHIPPED | OUTPATIENT
Start: 2023-07-25

## 2023-09-28 RX ORDER — ESCITALOPRAM OXALATE 10 MG/1
10 TABLET ORAL DAILY
Qty: 90 TABLET | Refills: 0 | Status: SHIPPED | OUTPATIENT
Start: 2023-09-28 | End: 2024-01-05 | Stop reason: SDUPTHER

## 2024-01-08 RX ORDER — ESCITALOPRAM OXALATE 10 MG/1
10 TABLET ORAL DAILY
Qty: 90 TABLET | Refills: 3 | Status: SHIPPED | OUTPATIENT
Start: 2024-01-08

## 2024-06-24 SDOH — ECONOMIC STABILITY: FOOD INSECURITY: WITHIN THE PAST 12 MONTHS, THE FOOD YOU BOUGHT JUST DIDN'T LAST AND YOU DIDN'T HAVE MONEY TO GET MORE.: NEVER TRUE

## 2024-06-24 SDOH — ECONOMIC STABILITY: HOUSING INSECURITY: IN THE LAST 12 MONTHS, HOW MANY PLACES HAVE YOU LIVED?: 1

## 2024-06-24 SDOH — HEALTH STABILITY: PHYSICAL HEALTH: ON AVERAGE, HOW MANY MINUTES DO YOU ENGAGE IN EXERCISE AT THIS LEVEL?: PATIENT DECLINED

## 2024-06-24 SDOH — HEALTH STABILITY: PHYSICAL HEALTH: ON AVERAGE, HOW MANY DAYS PER WEEK DO YOU ENGAGE IN MODERATE TO STRENUOUS EXERCISE (LIKE A BRISK WALK)?: 5 DAYS

## 2024-06-24 SDOH — ECONOMIC STABILITY: FOOD INSECURITY: WITHIN THE PAST 12 MONTHS, YOU WORRIED THAT YOUR FOOD WOULD RUN OUT BEFORE YOU GOT MONEY TO BUY MORE.: NEVER TRUE

## 2024-06-24 SDOH — ECONOMIC STABILITY: INCOME INSECURITY: IN THE LAST 12 MONTHS, WAS THERE A TIME WHEN YOU WERE NOT ABLE TO PAY THE MORTGAGE OR RENT ON TIME?: NO

## 2024-06-24 SDOH — ECONOMIC STABILITY: INCOME INSECURITY: HOW HARD IS IT FOR YOU TO PAY FOR THE VERY BASICS LIKE FOOD, HOUSING, MEDICAL CARE, AND HEATING?: SOMEWHAT HARD

## 2024-06-24 ASSESSMENT — SOCIAL DETERMINANTS OF HEALTH (SDOH)
HOW OFTEN DO YOU ATTEND CHURCH OR RELIGIOUS SERVICES?: 1 TO 4 TIMES PER YEAR
IN A TYPICAL WEEK, HOW MANY TIMES DO YOU TALK ON THE PHONE WITH FAMILY, FRIENDS, OR NEIGHBORS?: MORE THAN THREE TIMES A WEEK
HOW OFTEN DO YOU GET TOGETHER WITH FRIENDS OR RELATIVES?: ONCE A WEEK
WITHIN THE PAST 12 MONTHS, YOU WORRIED THAT YOUR FOOD WOULD RUN OUT BEFORE YOU GOT THE MONEY TO BUY MORE: NEVER TRUE
HOW MANY DRINKS CONTAINING ALCOHOL DO YOU HAVE ON A TYPICAL DAY WHEN YOU ARE DRINKING: 1 OR 2
DO YOU BELONG TO ANY CLUBS OR ORGANIZATIONS SUCH AS CHURCH GROUPS UNIONS, FRATERNAL OR ATHLETIC GROUPS, OR SCHOOL GROUPS?: YES
HOW OFTEN DO YOU HAVE A DRINK CONTAINING ALCOHOL: 2-3 TIMES A WEEK
HOW OFTEN DO YOU ATTEND CHURCH OR RELIGIOUS SERVICES?: 1 TO 4 TIMES PER YEAR
IN A TYPICAL WEEK, HOW MANY TIMES DO YOU TALK ON THE PHONE WITH FAMILY, FRIENDS, OR NEIGHBORS?: MORE THAN THREE TIMES A WEEK
HOW HARD IS IT FOR YOU TO PAY FOR THE VERY BASICS LIKE FOOD, HOUSING, MEDICAL CARE, AND HEATING?: SOMEWHAT HARD
DO YOU BELONG TO ANY CLUBS OR ORGANIZATIONS SUCH AS CHURCH GROUPS UNIONS, FRATERNAL OR ATHLETIC GROUPS, OR SCHOOL GROUPS?: YES
IN THE PAST 12 MONTHS, HAS THE ELECTRIC, GAS, OIL, OR WATER COMPANY THREATENED TO SHUT OFF SERVICE IN YOUR HOME?: NO
HOW OFTEN DO YOU HAVE SIX OR MORE DRINKS ON ONE OCCASION: LESS THAN MONTHLY
HOW OFTEN DO YOU GET TOGETHER WITH FRIENDS OR RELATIVES?: ONCE A WEEK

## 2024-06-24 ASSESSMENT — LIFESTYLE VARIABLES
SKIP TO QUESTIONS 9-10: 0
HOW OFTEN DO YOU HAVE A DRINK CONTAINING ALCOHOL: 2-3 TIMES A WEEK
HOW MANY STANDARD DRINKS CONTAINING ALCOHOL DO YOU HAVE ON A TYPICAL DAY: 1 OR 2
HOW OFTEN DO YOU HAVE SIX OR MORE DRINKS ON ONE OCCASION: LESS THAN MONTHLY
AUDIT-C TOTAL SCORE: 4

## 2024-06-24 NOTE — PROGRESS NOTES
Verbal consent was acquired by the patient to use [employer] ambient listening note generation during this visit Yes     Subjective:     CC: No chief complaint on file.        HPI:   Judd Mendez is a 44-year-old male who presents for follow-up visit.    Judd Mendez is a 44-year-old male who presents for annual wellness visit.    History of Present Illness            No past medical history on file.    Social History     Tobacco Use    Smoking status: Former    Smokeless tobacco: Never   Vaping Use    Vaping status: Never Used   Substance Use Topics    Alcohol use: Yes     Comment: 5-7 drinks per week    Drug use: Never       Current Outpatient Medications Ordered in Epic   Medication Sig Dispense Refill    escitalopram (LEXAPRO) 10 MG Tab Take 1 Tablet by mouth every day. 90 Tablet 3    escitalopram (LEXAPRO) 5 MG tablet TAKE 1 TABLET BY MOUTH EVERY DAY 90 Tablet 0     No current Epic-ordered facility-administered medications on file.       Allergies:  Patient has no known allergies.    Health Maintenance: Completed    Review of Systems:  No fevers or chills. No cough, chest pain, or shortness of breath.     Review of Systems:  Constitutional: Negative for fever, chills.  Respiratory: Negative for cough and shortness of breath.    Cardiovascular: Negative for chest pain or palpitations.  Gastrointestinal: Negative for abdominal pain, nausea, vomiting, and diarrhea.   Neurological: Negative for headaches, numbness, or tingling.  Psychiatric: Negative for anxiety or depression.      Objective:       Exam:  There were no vitals taken for this visit. There is no height or weight on file to calculate BMI.    Constitutional: Well-developed, no acute distress.  Lungs: Clear to auscultation bilaterally. No wheezes, ronchi, or rales.   Cardiovascular: Regular rate and rhythm, no murmurs noted.   Extremities: No edema or erythema.    Constitutional: Well-developed, no acute distress.  HEENT: Pupils are equal,  round, and reactive to light. Oropharynx is without erythema, edema or exudates.   Neck: No thyromegaly or palpable thyroid nodules. No cervical or supraclavicular lymphadenopathy noted.  Lungs: Clear to auscultation bilaterally. No wheezes, rhonchi, or rales.   Cardiovascular: Regular rate and rhythm, no murmurs noted.   Abdomen: Soft, nontender, nondistended.   Extremities: No edema or erythema.  Psychiatric:  Behavior, mood, and affect are appropriate.    Assessment & Plan:     44 y.o. male with the following -     The patient feels well and denies any acute medical complaints.  The patient's past medical and surgical history, medications, allergies, and family history were reviewed.  The patient is up to date on labs, immunizations, and other preventative care.     Health maintenance:  Diet:   Exercise:   Substance Use:     Immunizations:  Influenza:  PCV-20:  Tetanus:   Shingles:     Cancer screenings:  Colorectal Cancer Screening:   Cervical Cancer Screening:   Breast Cancer Screening:     Wellness examination  The patient feels well and denies any complaints. There are no concerning signs and/or symptoms of any significant disease process.  Normal exam findings.  The patient was counseled about regular exercise, healthy diet, abstinence from smoking, drugs, and excessive alcohol.     HCM: Completed  Labs per orders  Immunizations per orders  The patient was counseled about regular exercise, healthy diet, abstinence from smoking, drugs, and excessive alcohol.    Assessment & Plan            No follow-ups on file.    Please note that this dictation was created using voice recognition software. I have made every reasonable attempt to correct obvious errors, but I expect that there are errors of grammar and possibly content that I did not discover before finalizing the note.

## 2024-06-25 ENCOUNTER — OFFICE VISIT (OUTPATIENT)
Dept: MEDICAL GROUP | Facility: PHYSICIAN GROUP | Age: 44
End: 2024-06-25
Payer: COMMERCIAL

## 2024-06-25 VITALS
DIASTOLIC BLOOD PRESSURE: 68 MMHG | SYSTOLIC BLOOD PRESSURE: 118 MMHG | HEIGHT: 70 IN | OXYGEN SATURATION: 98 % | WEIGHT: 190 LBS | TEMPERATURE: 97.7 F | HEART RATE: 60 BPM | BODY MASS INDEX: 27.2 KG/M2

## 2024-06-25 DIAGNOSIS — Z02.89 ENCOUNTER FOR COMPLETION OF FORM WITH PATIENT: ICD-10-CM

## 2024-06-25 DIAGNOSIS — F43.10 PTSD (POST-TRAUMATIC STRESS DISORDER): ICD-10-CM

## 2024-06-25 PROCEDURE — 3078F DIAST BP <80 MM HG: CPT | Performed by: INTERNAL MEDICINE

## 2024-06-25 PROCEDURE — 3074F SYST BP LT 130 MM HG: CPT | Performed by: INTERNAL MEDICINE

## 2024-06-25 PROCEDURE — 99213 OFFICE O/P EST LOW 20 MIN: CPT | Performed by: INTERNAL MEDICINE

## 2024-06-25 ASSESSMENT — PATIENT HEALTH QUESTIONNAIRE - PHQ9: CLINICAL INTERPRETATION OF PHQ2 SCORE: 0

## 2024-06-27 DIAGNOSIS — Z11.1 SCREENING-PULMONARY TB: ICD-10-CM

## 2024-07-01 ENCOUNTER — HOSPITAL ENCOUNTER (OUTPATIENT)
Dept: LAB | Facility: MEDICAL CENTER | Age: 44
End: 2024-07-01
Attending: INTERNAL MEDICINE
Payer: COMMERCIAL

## 2024-07-01 DIAGNOSIS — Z11.1 SCREENING-PULMONARY TB: ICD-10-CM

## 2024-07-01 PROCEDURE — 86480 TB TEST CELL IMMUN MEASURE: CPT

## 2024-07-01 PROCEDURE — 36415 COLL VENOUS BLD VENIPUNCTURE: CPT

## 2024-07-03 LAB
GAMMA INTERFERON BACKGROUND BLD IA-ACNC: 0.08 IU/ML
M TB IFN-G BLD-IMP: NEGATIVE
M TB IFN-G CD4+ BCKGRND COR BLD-ACNC: 0.02 IU/ML
MITOGEN IGNF BCKGRD COR BLD-ACNC: >10 IU/ML
QFT TB2 - NIL TBQ2: 0.08 IU/ML

## 2024-09-04 ENCOUNTER — TELEPHONE (OUTPATIENT)
Dept: MEDICAL GROUP | Facility: PHYSICIAN GROUP | Age: 44
End: 2024-09-04
Payer: COMMERCIAL

## 2024-09-04 RX ORDER — ESCITALOPRAM OXALATE 20 MG/1
20 TABLET ORAL DAILY
Qty: 90 TABLET | Refills: 3 | Status: SHIPPED | OUTPATIENT
Start: 2024-09-04

## 2024-09-04 NOTE — TELEPHONE ENCOUNTER
I hope you are doing well. Mario Alberto would like to up his dose of mood stabilizers he’s on if possible. I don’t know if we could double it or if you have another suggestion, he said it hasn’t been working like it used to. Does he need to schedule another appointment with you or can we just up the dose without coming in?  Thanks!  Martin

## 2025-02-25 ENCOUNTER — OFFICE VISIT (OUTPATIENT)
Dept: MEDICAL GROUP | Facility: PHYSICIAN GROUP | Age: 45
End: 2025-02-25
Payer: COMMERCIAL

## 2025-02-25 VITALS
WEIGHT: 191 LBS | BODY MASS INDEX: 27.35 KG/M2 | OXYGEN SATURATION: 98 % | HEIGHT: 70 IN | DIASTOLIC BLOOD PRESSURE: 64 MMHG | RESPIRATION RATE: 16 BRPM | TEMPERATURE: 98.1 F | HEART RATE: 64 BPM | SYSTOLIC BLOOD PRESSURE: 106 MMHG

## 2025-02-25 DIAGNOSIS — S42.022D DISPLACED FRACTURE OF SHAFT OF LEFT CLAVICLE, SUBSEQUENT ENCOUNTER FOR FRACTURE WITH ROUTINE HEALING: ICD-10-CM

## 2025-02-25 DIAGNOSIS — S62.640D CLOSED NONDISPLACED FRACTURE OF PROXIMAL PHALANX OF RIGHT INDEX FINGER WITH ROUTINE HEALING: ICD-10-CM

## 2025-02-25 DIAGNOSIS — F12.90 MARIJUANA USE: ICD-10-CM

## 2025-02-25 DIAGNOSIS — R73.03 PREDIABETES: ICD-10-CM

## 2025-02-25 DIAGNOSIS — Z76.89 ENCOUNTER TO ESTABLISH CARE WITH NEW DOCTOR: ICD-10-CM

## 2025-02-25 DIAGNOSIS — F43.10 PTSD (POST-TRAUMATIC STRESS DISORDER): ICD-10-CM

## 2025-02-25 DIAGNOSIS — Z11.59 NEED FOR HEPATITIS C SCREENING TEST: ICD-10-CM

## 2025-02-25 DIAGNOSIS — Z00.00 WELL ADULT EXAM: ICD-10-CM

## 2025-02-25 DIAGNOSIS — F32.A DEPRESSION, UNSPECIFIED DEPRESSION TYPE: ICD-10-CM

## 2025-02-25 DIAGNOSIS — Z23 NEED FOR VACCINATION: ICD-10-CM

## 2025-02-25 DIAGNOSIS — F41.9 ANXIETY: ICD-10-CM

## 2025-02-25 DIAGNOSIS — Z11.4 SCREENING FOR HIV (HUMAN IMMUNODEFICIENCY VIRUS): ICD-10-CM

## 2025-02-25 PROBLEM — D36.10 NEUROMA: Status: RESOLVED | Noted: 2021-05-14 | Resolved: 2025-02-25

## 2025-02-25 PROBLEM — R53.83 FATIGUE: Status: RESOLVED | Noted: 2021-05-14 | Resolved: 2025-02-25

## 2025-02-25 PROCEDURE — 99215 OFFICE O/P EST HI 40 MIN: CPT | Performed by: INTERNAL MEDICINE

## 2025-02-25 PROCEDURE — 3078F DIAST BP <80 MM HG: CPT | Performed by: INTERNAL MEDICINE

## 2025-02-25 PROCEDURE — 3074F SYST BP LT 130 MM HG: CPT | Performed by: INTERNAL MEDICINE

## 2025-02-25 SDOH — HEALTH STABILITY: PHYSICAL HEALTH: ON AVERAGE, HOW MANY DAYS PER WEEK DO YOU ENGAGE IN MODERATE TO STRENUOUS EXERCISE (LIKE A BRISK WALK)?: 5 DAYS

## 2025-02-25 SDOH — ECONOMIC STABILITY: INCOME INSECURITY: IN THE LAST 12 MONTHS, WAS THERE A TIME WHEN YOU WERE NOT ABLE TO PAY THE MORTGAGE OR RENT ON TIME?: YES

## 2025-02-25 SDOH — ECONOMIC STABILITY: FOOD INSECURITY: WITHIN THE PAST 12 MONTHS, YOU WORRIED THAT YOUR FOOD WOULD RUN OUT BEFORE YOU GOT MONEY TO BUY MORE.: NEVER TRUE

## 2025-02-25 SDOH — ECONOMIC STABILITY: INCOME INSECURITY: HOW HARD IS IT FOR YOU TO PAY FOR THE VERY BASICS LIKE FOOD, HOUSING, MEDICAL CARE, AND HEATING?: NOT HARD AT ALL

## 2025-02-25 SDOH — ECONOMIC STABILITY: FOOD INSECURITY: WITHIN THE PAST 12 MONTHS, THE FOOD YOU BOUGHT JUST DIDN'T LAST AND YOU DIDN'T HAVE MONEY TO GET MORE.: NEVER TRUE

## 2025-02-25 SDOH — HEALTH STABILITY: PHYSICAL HEALTH: ON AVERAGE, HOW MANY MINUTES DO YOU ENGAGE IN EXERCISE AT THIS LEVEL?: 30 MIN

## 2025-02-25 SDOH — HEALTH STABILITY: MENTAL HEALTH
STRESS IS WHEN SOMEONE FEELS TENSE, NERVOUS, ANXIOUS, OR CAN'T SLEEP AT NIGHT BECAUSE THEIR MIND IS TROUBLED. HOW STRESSED ARE YOU?: NOT AT ALL

## 2025-02-25 SDOH — ECONOMIC STABILITY: HOUSING INSECURITY
IN THE LAST 12 MONTHS, WAS THERE A TIME WHEN YOU DID NOT HAVE A STEADY PLACE TO SLEEP OR SLEPT IN A SHELTER (INCLUDING NOW)?: YES

## 2025-02-25 ASSESSMENT — SOCIAL DETERMINANTS OF HEALTH (SDOH)
HOW OFTEN DO YOU ATTEND CHURCH OR RELIGIOUS SERVICES?: 1 TO 4 TIMES PER YEAR
IN A TYPICAL WEEK, HOW MANY TIMES DO YOU TALK ON THE PHONE WITH FAMILY, FRIENDS, OR NEIGHBORS?: TWICE A WEEK
HOW MANY DRINKS CONTAINING ALCOHOL DO YOU HAVE ON A TYPICAL DAY WHEN YOU ARE DRINKING: 1 OR 2
DO YOU BELONG TO ANY CLUBS OR ORGANIZATIONS SUCH AS CHURCH GROUPS UNIONS, FRATERNAL OR ATHLETIC GROUPS, OR SCHOOL GROUPS?: NO
IN THE PAST 12 MONTHS, HAS THE ELECTRIC, GAS, OIL, OR WATER COMPANY THREATENED TO SHUT OFF SERVICE IN YOUR HOME?: NO
HOW OFTEN DO YOU GET TOGETHER WITH FRIENDS OR RELATIVES?: NEVER
HOW OFTEN DO YOU HAVE A DRINK CONTAINING ALCOHOL: 2-3 TIMES A WEEK
HOW OFTEN DO YOU GET TOGETHER WITH FRIENDS OR RELATIVES?: NEVER
IN A TYPICAL WEEK, HOW MANY TIMES DO YOU TALK ON THE PHONE WITH FAMILY, FRIENDS, OR NEIGHBORS?: TWICE A WEEK
DO YOU BELONG TO ANY CLUBS OR ORGANIZATIONS SUCH AS CHURCH GROUPS UNIONS, FRATERNAL OR ATHLETIC GROUPS, OR SCHOOL GROUPS?: NO
WITHIN THE PAST 12 MONTHS, YOU WORRIED THAT YOUR FOOD WOULD RUN OUT BEFORE YOU GOT THE MONEY TO BUY MORE: NEVER TRUE
HOW HARD IS IT FOR YOU TO PAY FOR THE VERY BASICS LIKE FOOD, HOUSING, MEDICAL CARE, AND HEATING?: NOT HARD AT ALL
HOW OFTEN DO YOU ATTENT MEETINGS OF THE CLUB OR ORGANIZATION YOU BELONG TO?: PATIENT DECLINED
HOW OFTEN DO YOU ATTENT MEETINGS OF THE CLUB OR ORGANIZATION YOU BELONG TO?: PATIENT DECLINED
HOW OFTEN DO YOU HAVE SIX OR MORE DRINKS ON ONE OCCASION: LESS THAN MONTHLY
HOW OFTEN DO YOU ATTEND CHURCH OR RELIGIOUS SERVICES?: 1 TO 4 TIMES PER YEAR

## 2025-02-25 ASSESSMENT — PATIENT HEALTH QUESTIONNAIRE - PHQ9: CLINICAL INTERPRETATION OF PHQ2 SCORE: 0

## 2025-02-25 ASSESSMENT — LIFESTYLE VARIABLES
HOW OFTEN DO YOU HAVE A DRINK CONTAINING ALCOHOL: 2-3 TIMES A WEEK
HOW MANY STANDARD DRINKS CONTAINING ALCOHOL DO YOU HAVE ON A TYPICAL DAY: 1 OR 2
SKIP TO QUESTIONS 9-10: 0
HOW OFTEN DO YOU HAVE SIX OR MORE DRINKS ON ONE OCCASION: LESS THAN MONTHLY
AUDIT-C TOTAL SCORE: 4

## 2025-02-26 NOTE — ASSESSMENT & PLAN NOTE
Chronic condition.  The patient reported that he was deployed to [place] x2 and [place]  Patient currently taking Lexapro.  The patient reports symptoms are followed well-controlled.  Patient denies SI.  Patient currently not seeing a therapist.

## 2025-02-26 NOTE — ASSESSMENT & PLAN NOTE
Chronic condition.  Patient reported left clavicle fracture around November 2024.  Patient was seen by orthopedic surgeon.  Surgery not recommended.    Patient denies paresthesia or motor weakness.  No significant pain reported.

## 2025-02-26 NOTE — PROGRESS NOTES
PRIMARY CARE CLINIC VISIT        Chief Complaint   Patient presents with    Establish Care      New patient here to establish care  Anxiety  Depression  PTSD  Prediabetes  Marijuana use  Left clavicle fracture  Right index fracture  Request lab test  Vaccination status      History of Present Illness     PTSD (post-traumatic stress disorder)  Chronic condition.  The patient reported that he was deployed to [place] x2 and [place]  Patient currently taking Lexapro.  The patient reports symptoms are followed well-controlled.  Patient denies SI.  Patient currently not seeing a therapist.    Anxiety  Chronic condition.  The patient currently taking Lexapro.  Patient presently not followed by behavioral specialist.  Patient tolerating medication well.  Symptoms are well-controlled    Depression  This is a chronic condition.  Patient is taking Lexapro as above.  Patient denies SI.  No new concerns reported.    Prediabetes  Chronic condition.  This was noted with chart review.  Patient unaware of the diagnosis.  Recommend lab test to be done for follow-up    Marijuana use  Patient reported using marijuana on occasion  Recommend patient to discontinue    Left clavicle fracture  Chronic condition.  Patient reported left clavicle fracture around November 2024.  Patient was seen by orthopedic surgeon.  Surgery not recommended.    Patient denies paresthesia or motor weakness.  No significant pain reported.    Right index finger fracture  Chronic condition.  Work-related.  Patient stated that suture was done but the patient did not require any surgery.  Presently no significant pain noted.    Need for vaccination  Patient is due for influenza vaccine.  Our clinics however ran out the vaccine.  The patient recommend to go to local pharmacy.    Current Outpatient Medications on File Prior to Visit   Medication Sig Dispense Refill    escitalopram (LEXAPRO) 20 MG tablet Take 1 Tablet by mouth every day. 90 Tablet 3     No current  "facility-administered medications on file prior to visit.        Allergies: Patient has no known allergies.    Current Outpatient Medications Ordered in Epic   Medication Sig Dispense Refill    escitalopram (LEXAPRO) 20 MG tablet Take 1 Tablet by mouth every day. 90 Tablet 3     No current Epic-ordered facility-administered medications on file.       History reviewed. No pertinent past medical history.    History reviewed. No pertinent surgical history.    Family History   Problem Relation Age of Onset    No Known Problems Mother     No Known Problems Father        Social History     Tobacco Use   Smoking Status Former   Smokeless Tobacco Never       Social History     Substance and Sexual Activity   Alcohol Use Yes    Comment: 5-7 drinks per week       Review of systems  As per HPI above. All other systems reviewed and negative.      Past Medical, Social, and Family history reviewed and updated in [facility]       LAB DATA:     I have independently reviewed / interpreted labs    No results found for: \"HBA1C\"     Lab Results   Component Value Date/Time    WBC 6.9 05/18/2021 06:48 AM    HEMOGLOBIN 16.1 05/18/2021 06:48 AM    HEMATOCRIT 47.7 05/18/2021 06:48 AM    MCV 89.7 05/18/2021 06:48 AM    PLATELETCT 264 05/18/2021 06:48 AM       Lab Results   Component Value Date/Time    SODIUM 138 05/18/2021 06:48 AM    POTASSIUM 4.2 05/18/2021 06:48 AM    GLUCOSE 101 (H) 05/18/2021 06:48 AM    BUN 13 05/18/2021 06:48 AM    CREATININE 1.20 05/18/2021 06:48 AM       Lab Results   Component Value Date/Time    CHOLSTRLTOT 149 05/18/2021 06:48 AM    TRIGLYCERIDE 146 05/18/2021 06:48 AM    HDL 39 (A) 05/18/2021 06:48 AM    LDL 81 05/18/2021 06:48 AM       Lab Results   Component Value Date/Time    ALTSGPT 33 05/18/2021 06:48 AM          Objective     /64 (BP Location: Left arm, Patient Position: Sitting, BP Cuff Size: Adult)   Pulse 64   Temp 36.7 °C (98.1 °F) (Temporal)   Resp 16   Ht 1.778 m (5' 10\")   Wt 86.6 kg (191 lb)   " SpO2 98%    Body mass index is 27.41 kg/m².    General: alert in no apparent distress.  Cardiovascular: regular rate and rhythm  Pulmonary: lungs : no wheezing   Gastrointestinal: BS present.   Left clavicle there is a bony protrusion proximal left clavicle.   Range of motion of the left shoulder significant restriction noted.  Right index finger range of motion limited especially with finger flexion at PIP joint.    Assessment and Plan     1. Encounter to establish care with new doctor    2. Anxiety  Chronic stable condition continue Lexapro 20 Mg daily.  No indication for mental health referral    3. Depression, unspecified depression type  Chronic stable.  Continue Lexapro 20 Mg daily.  No indication for mental health referral.    4. PTSD (post-traumatic stress disorder)  Chronic stable condition.  Continue to monitor.  No indication for mental health referral.    5. Prediabetes  - HEMOGLOBIN A1C; Future  - Basic Metabolic Panel; Future  Chronic condition.  Current status unclear.  Lab test ordered for follow-up  Recommend diet and lifestyle modification    6. Marijuana use  Chronic.  The patient reported using marijuana occasionally.  Recommend patient to discontinue.    7. Displaced fracture of shaft of left clavicle, subsequent encounter for fracture with routine healing  Patient reported previous left clavicle fracture 2024.  Surgery not required.  No significant pain noted at this time.  Continue to monitor    8. Closed nondisplaced fracture of proximal phalanx of right index finger with routine healing  Chronic condition.  The patient presently with limited range of motion.  Stable.  Surgery not recommended by orthopedic specialist.  Continue to monitor    9. Well adult exam  - ALANINE AMINO-TRANS; Future  - CBC WITH DIFFERENTIAL; Future  - Lipid Profile; Future  - PROSTATE SPECIFIC AG SCREENING; Future  - VITAMIN D,25 HYDROXY (DEFICIENCY); Future  - MICROALBUMIN CREAT RATIO URINE; Future  Lab test ordered.   Advised the patient to follow-up after a few days    10. Screening for HIV (human immunodeficiency virus)  - HIV AG/AB COMBO ASSAY SCREENING; Future    11. Need for hepatitis C screening test  - HEP C VIRUS ANTIBODY; Future    12. Need for vaccination  Patient will go to local pharmacy for flu shot.              Time spent:   46  minutes     Reviewed medical records including:  October 10, 2024 medical office note  September 9, 2024 medical office note  August 5, 2024 medical office note  July 9, 2024 medical office note  June 25, 2024 medical office note  Laboratory data dated July 1, 2024, February 22, 2022, May 18, 2021  Radiology report dated February 22, 2022    Total time includes face to face time and non-face to face time.  Chart review before the visit, the actual patient visit, and time spent on documentation in the EMR after the visit.  Chart review/prep, review of other providers' records, Independent review of imagings/labs ,  time for history/examination , pt's counseling/education, ordering, prescribing, treatment plan discussed with patient, and care coordination.              Please note that this dictation was created using voice recognition software. I have made every reasonable attempt to correct obvious errors, but I expect that there are errors of grammar and possibly content that I did not discover before finalizing the note.    Nam Swanson MD  Internal Medicine  [facility]

## 2025-02-26 NOTE — ASSESSMENT & PLAN NOTE
Chronic condition.  The patient currently taking Lexapro.  Patient presently not followed by behavioral specialist.  Patient tolerating medication well.  Symptoms are well-controlled

## 2025-02-26 NOTE — ASSESSMENT & PLAN NOTE
Patient is due for influenza vaccine.  Our clinics however ran out the vaccine.  The patient recommend to go to local pharmacy.

## 2025-02-26 NOTE — ASSESSMENT & PLAN NOTE
This is a chronic condition.  Patient is taking Lexapro as above.  Patient denies SI.  No new concerns reported.

## 2025-02-26 NOTE — ASSESSMENT & PLAN NOTE
Chronic condition.  This was noted with chart review.  Patient unaware of the diagnosis.  Recommend lab test to be done for follow-up